# Patient Record
Sex: MALE | Race: WHITE | Employment: FULL TIME | RURAL
[De-identification: names, ages, dates, MRNs, and addresses within clinical notes are randomized per-mention and may not be internally consistent; named-entity substitution may affect disease eponyms.]

---

## 2017-01-13 ENCOUNTER — OFFICE VISIT (OUTPATIENT)
Dept: FAMILY MEDICINE CLINIC | Age: 27
End: 2017-01-13

## 2017-01-13 VITALS
RESPIRATION RATE: 17 BRPM | TEMPERATURE: 98.6 F | OXYGEN SATURATION: 98 % | DIASTOLIC BLOOD PRESSURE: 80 MMHG | SYSTOLIC BLOOD PRESSURE: 144 MMHG | HEART RATE: 76 BPM | WEIGHT: 222.2 LBS | BODY MASS INDEX: 35.86 KG/M2

## 2017-01-13 NOTE — PATIENT INSTRUCTIONS
Elevated Blood Pressure: Care Instructions  Your Care Instructions    Blood pressure is a measure of how hard the blood pushes against the walls of your arteries. It's normal for blood pressure to go up and down throughout the day. But if it stays up over time, you have high blood pressure. Two numbers tell you your blood pressure. The first number is the systolic pressure. It shows how hard the blood pushes when your heart is pumping. The second number is the diastolic pressure. It shows how hard the blood pushes between heartbeats, when your heart is relaxed and filling with blood. An ideal blood pressure in adults is less than 120/80 (say \"120 over 80\"). High blood pressure is 140/90 or higher. You have high blood pressure if your top number is 140 or higher or your bottom number is 90 or higher, or both. The main test for high blood pressure is simple, fast, and painless. To diagnose high blood pressure, your doctor will test your blood pressure at different times. You may have to check your blood pressure at home if there is reason to think that the results in the doctor's office aren't accurate. If you are diagnosed with high blood pressure, you can work with your doctor to make a long-term plan to manage it. Follow-up care is a key part of your treatment and safety. Be sure to make and go to all appointments, and call your doctor if you are having problems. It's also a good idea to know your test results and keep a list of the medicines you take. How can you care for yourself at home? · Do not smoke. Smoking increases your risk for heart attack and stroke. If you need help quitting, talk to your doctor about stop-smoking programs and medicines. These can increase your chances of quitting for good. · Stay at a healthy weight. · Try to limit how much sodium you eat to less than 2,300 milligrams (mg) a day. Your doctor may ask you to try to eat less than 1,500 mg a day. · Be physically active.  Get at least 30 minutes of exercise on most days of the week. Walking is a good choice. You also may want to do other activities, such as running, swimming, cycling, or playing tennis or team sports. · Avoid or limit alcohol. Talk to your doctor about whether you can drink any alcohol. · Eat plenty of fruits, vegetables, and low-fat dairy products. Eat less saturated and total fats. · Learn how to check your blood pressure at home. When should you call for help? Call your doctor now or seek immediate medical care if:  · Your blood pressure is much higher than normal (such as 180/110 or higher). · You think high blood pressure is causing symptoms such as:  ¨ Severe headache. ¨ Blurry vision. Watch closely for changes in your health, and be sure to contact your doctor if:  · You do not get better as expected. Where can you learn more? Go to http://thomas-akbar.info/. Enter T480 in the search box to learn more about \"Elevated Blood Pressure: Care Instructions. \"  Current as of: January 27, 2016  Content Version: 11.1  © 8924-5934 [employer]. Care instructions adapted under license by [employer] (which disclaims liability or warranty for this information). If you have questions about a medical condition or this instruction, always ask your healthcare professional. [address] any warranty or liability for your use of this information.

## 2017-01-13 NOTE — PROGRESS NOTES
Alisa Almaraz is a 32 y.o. male presenting for/with:    Physical      HPI:  Symptoms include occ sleep issues still. Using elevated pillow helps. Did get sleep study (home check) last winter, but never heard results. Saw Dr. Leticia Walsh. [place] Sleepiness Score: 2    Otherwise no complaints. Can run > half mile without stopping. Can lift 50# without dyspnea. PMH, SH, Medications/Allergies: reviewed, on chart. ROS:  Constitutional: No fever, chills or weight loss  Respiratory: No cough, SOB   CV: No chest pain or Palpitations    Visit Vitals    /80 (BP 1 Location: Right arm, BP Patient Position: Sitting)  Comment (BP 1 Location): machine    Pulse 76    Temp 98.6 °F (37 °C) (Oral)    Resp 17    Wt 222 lb 3.2 oz (100.8 kg)    SpO2 98%    BMI 35.86 kg/m2       Wt Readings from Last 3 Encounters:   01/13/17 222 lb 3.2 oz (100.8 kg)   02/24/16 209 lb (94.8 kg)   01/27/16 204 lb (92.5 kg)     Physical Examination: General appearance - alert, well appearing, and in no distress  Mental status - alert, oriented to person, place, and time  Eyes - pupils equal and reactive, extraocular eye movements intact  ENT - bilateral external ears and nose normal. Normal lips  Neck - supple, no significant adenopathy, no thyromegaly or mass  Lymphatics - no palpable lymphadenopathy, no hepatosplenomegaly  Chest - clear to auscultation, no wheezes, rales or rhonchi, symmetric air entry  Heart - normal rate, regular rhythm, normal S1, S2, no murmurs, rubs, clicks or gallops  Extremities - peripheral pulses normal, no pedal edema, no clubbing or cyanosis    A/P:  Well male   Fit for full duty. Elev BP  Mild. Work on [employer]. Recheck 1y or PRN.     F/U PRN/1y

## 2017-01-13 NOTE — MR AVS SNAPSHOT
Visit Information Date & Time Provider Department Dept. Phone Encounter #  
 1/13/2017  1:40 PM Anni Valentine MD [facility] [id number] Follow-up Instructions Return in about 1 year (around 1/13/2018). Upcoming Health Maintenance Date Due INFLUENZA AGE 9 TO ADULT 8/1/2016 DTaP/Tdap/Td series (3 - Td) 1/27/2026 Allergies as of 1/13/2017  Review Complete On: 1/13/2017 By: Anni Valentine MD  
 No Known Allergies Current Immunizations  Reviewed on 1/27/2016 Name Date Hep B Vaccine 4/19/2001, 12/14/2000, 10/12/2000 Influenza Vaccine 8/17/2016, 11/12/2015 MMR 5/3/1995, 6/15/1991 Meningococcal Vaccine 7/31/2008 Td 9/12/2005 Tdap 1/27/2016 Not reviewed this visit You Were Diagnosed With   
  
 Codes Comments Elevated blood pressure    -  Primary ICD-10-CM: I10 
ICD-9-CM: 401.9 Vitals BP Pulse Temp Resp Weight(growth percentile) SpO2  
 144/80 (BP 1 Location: Right arm, BP Patient Position: Sitting) 76 98.6 °F (37 °C) (Oral) 17 222 lb 3.2 oz (100.8 kg) 98% BMI Smoking Status 35.86 kg/m2 Never Smoker Vitals History BMI and BSA Data Body Mass Index Body Surface Area  
 35.86 kg/m 2 2.17 m 2 Preferred Pharmacy Pharmacy Name Phone [id number], [address] [place] [phone] Your Updated Medication List  
  
Notice  As of 1/13/2017  2:08 PM  
 You have not been prescribed any medications. Follow-up Instructions Return in about 1 year (around 1/13/2018). Patient Instructions Elevated Blood Pressure: Care Instructions Your Care Instructions Blood pressure is a measure of how hard the blood pushes against the walls of your arteries. It's normal for blood pressure to go up and down throughout the day. But if it stays up over time, you have high blood pressure. Two numbers tell you your blood pressure. The first number is the systolic pressure. It shows how hard the blood pushes when your heart is pumping. The second number is the diastolic pressure. It shows how hard the blood pushes between heartbeats, when your heart is relaxed and filling with blood. An ideal blood pressure in adults is less than 120/80 (say \"120 over 80\"). High blood pressure is 140/90 or higher. You have high blood pressure if your top number is 140 or higher or your bottom number is 90 or higher, or both. The main test for high blood pressure is simple, fast, and painless. To diagnose high blood pressure, your doctor will test your blood pressure at different times. You may have to check your blood pressure at home if there is reason to think that the results in the doctor's office aren't accurate. If you are diagnosed with high blood pressure, you can work with your doctor to make a long-term plan to manage it. Follow-up care is a key part of your treatment and safety. Be sure to make and go to all appointments, and call your doctor if you are having problems. It's also a good idea to know your test results and keep a list of the medicines you take. How can you care for yourself at home? · Do not smoke. Smoking increases your risk for heart attack and stroke. If you need help quitting, talk to your doctor about stop-smoking programs and medicines. These can increase your chances of quitting for good. · Stay at a healthy weight. · Try to limit how much sodium you eat to less than 2,300 milligrams (mg) a day. Your doctor may ask you to try to eat less than 1,500 mg a day. · Be physically active. Get at least 30 minutes of exercise on most days of the week. Walking is a good choice. You also may want to do other activities, such as running, swimming, cycling, or playing tennis or team sports. · Avoid or limit alcohol. Talk to your doctor about whether you can drink any alcohol. · Eat plenty of fruits, vegetables, and low-fat dairy products. Eat less saturated and total fats. · Learn how to check your blood pressure at home. When should you call for help? Call your doctor now or seek immediate medical care if: 
· Your blood pressure is much higher than normal (such as 180/110 or higher). · You think high blood pressure is causing symptoms such as: ¨ Severe headache. ¨ Blurry vision. Watch closely for changes in your health, and be sure to contact your doctor if: 
· You do not get better as expected. Where can you learn more? Go to http://thomas-akbar.info/. Enter M915 in the search box to learn more about \"Elevated Blood Pressure: Care Instructions. \" Current as of: January 27, 2016 Content Version: 11.1 © 4043-7720 [employer]. Care instructions adapted under license by [employer] (which disclaims liability or warranty for this information). If you have questions about a medical condition or this instruction, always ask your healthcare professional. [place] [address] any warranty or liability for your use of this information. Introducing [facility] & HEALTH SERVICES! Dear Oswaldo Schultz: 
Thank you for requesting a MyCrowd account. Our records indicate that you already have an active MyCrowd account. You can access your account anytime at [email or web address] Did you know that you can access your hospital and ER discharge instructions at any time in MyCrowd? You can also review all of your test results from your hospital stay or ER visit. Additional Information If you have questions, please visit the Frequently Asked Questions section of the MyCrowd website at [email or web address] Remember, MyCrowd is NOT to be used for urgent needs. For medical emergencies, dial 911. Now available from your iPhone and Android! Please provide this summary of care documentation to your next provider. Your primary care clinician is listed as Abraham Walton. If you have any questions after today's visit, please call [phone].

## 2017-05-26 ENCOUNTER — OFFICE VISIT (OUTPATIENT)
Dept: FAMILY MEDICINE CLINIC | Age: 27
End: 2017-05-26

## 2017-05-26 VITALS
BODY MASS INDEX: 32.72 KG/M2 | WEIGHT: 203.6 LBS | DIASTOLIC BLOOD PRESSURE: 82 MMHG | HEIGHT: 66 IN | TEMPERATURE: 97.8 F | HEART RATE: 62 BPM | OXYGEN SATURATION: 98 % | RESPIRATION RATE: 18 BRPM | SYSTOLIC BLOOD PRESSURE: 142 MMHG

## 2017-05-26 DIAGNOSIS — F43.0 STRESS REACTION: Primary | ICD-10-CM
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
## 2017-05-26 NOTE — MR AVS SNAPSHOT
Visit Information Date & Time Provider Department Dept. Phone Encounter #  
 5/26/2017  2:20 PM Jayden Jaimes MD [facility] [id number] Follow-up Instructions Return if symptoms worsen or fail to improve. Follow-up and Disposition History Upcoming Health Maintenance Date Due INFLUENZA AGE 9 TO ADULT 8/1/2017 DTaP/Tdap/Td series (3 - Td) 1/27/2026 Allergies as of 5/26/2017  Review Complete On: 5/26/2017 By: Jayden Jaimes MD  
 No Known Allergies Current Immunizations  Reviewed on 5/26/2017 Name Date Hep B Vaccine 4/19/2001, 12/14/2000, 10/12/2000 Influenza Vaccine 8/17/2016, 11/12/2015 MMR 5/3/1995, 6/15/1991 Meningococcal Vaccine 7/31/2008 Td 9/12/2005 Tdap 1/27/2016 Reviewed by Lori Flores on 5/26/2017 at  2:30 PM  
You Were Diagnosed With   
  
 Codes Comments Stress reaction    -  Primary ICD-10-CM: F43.0 ICD-9-CM: 308. 9 Vitals BP Pulse Temp Resp Height(growth percentile) Weight(growth percentile) 142/82 (BP 1 Location: Right arm, BP Patient Position: Sitting) 62 97.8 °F (36.6 °C) (Oral) 18 5' 6\" (1.676 m) 203 lb 9.6 oz (92.4 kg) SpO2 BMI Smoking Status 98% 32.86 kg/m2 Never Smoker BMI and BSA Data Body Mass Index Body Surface Area  
 32.86 kg/m 2 2.07 m 2 Preferred Pharmacy Pharmacy Name Phone [id number] [place], [address] [place] [phone] Your Updated Medication List  
  
Notice  As of 5/26/2017  3:41 PM  
 You have not been prescribed any medications. Follow-up Instructions Return if symptoms worsen or fail to improve. Introducing [facility] & HEALTH SERVICES! Dear Sang Eddy: 
Thank you for requesting a Meliuz account. Our records indicate that you already have an active Meliuz account. You can access your account anytime at [email or web address] Did you know that you can access your hospital and ER discharge instructions at any time in MysteryD? You can also review all of your test results from your hospital stay or ER visit. Additional Information If you have questions, please visit the Frequently Asked Questions section of the MysteryD website at [email or web address] Remember, MysteryD is NOT to be used for urgent needs. For medical emergencies, dial 911. Now available from your iPhone and Android! Please provide this summary of care documentation to your next provider. Your primary care clinician is listed as Mo Walton. If you have any questions after today's visit, please call [phone].

## 2017-05-26 NOTE — LETTER
5/26/2017 3:36 PM 
 
Re: 
Mr. [facility] [address] [facility] [address] [place] [address] To whom it may concern, 
 
I have performed a psychological and physical evaluation of above patient today and found him to be fit for full duty. He does not show any sign of physical or psychological impairment on exam. 
 
 
 
 
Sincerely, Jared Hdez MD

## 2018-04-04 ENCOUNTER — TELEPHONE (OUTPATIENT)
Dept: SLEEP MEDICINE | Age: 28
End: 2018-04-04

## 2018-04-04 DIAGNOSIS — G47.33 OSA (OBSTRUCTIVE SLEEP APNEA): Primary | ICD-10-CM

## 2018-04-04 NOTE — TELEPHONE ENCOUNTER
Patient was called and has agreed to a PSG a [facility] on April 22,2018. [facility] SLEEP LAB USERS POOL to schedule.

## 2018-04-04 NOTE — TELEPHONE ENCOUNTER
Patient failed HSAT at previous attempt, recommend attended PSG at [facility].     Orders Placed This Encounter    POLYSOMNOGRAPHY 1 NIGHT     Standing Status:   Future     Standing Expiration Date:   10/3/2018     Order Specific Question:   Reason for Exam     Answer:   RYAN

## 2018-04-20 ENCOUNTER — TELEPHONE (OUTPATIENT)
Dept: SLEEP MEDICINE | Age: 28
End: 2018-04-20

## 2018-05-11 ENCOUNTER — DOCUMENTATION ONLY (OUTPATIENT)
Dept: SLEEP MEDICINE | Age: 28
End: 2018-05-11

## 2018-08-21 ENCOUNTER — TELEPHONE (OUTPATIENT)
Dept: FAMILY MEDICINE CLINIC | Age: 28
End: 2018-08-21

## 2018-08-21 NOTE — TELEPHONE ENCOUNTER
----- Message from Althea Cortez sent at 8/20/2018  4:40 PM EDT -----  Regarding: Dr. Kiley Damon  Pt requested a call back in regards to scheduling an sooner appt than 8/24/18 due to back spasmus. Best contact [phone].

## 2023-03-21 ENCOUNTER — HOSPITAL ENCOUNTER (EMERGENCY)
Age: 33
Discharge: HOME | End: 2023-03-21
Attending: EMERGENCY MEDICINE
Payer: COMMERCIAL

## 2023-03-21 VITALS
DIASTOLIC BLOOD PRESSURE: 90 MMHG | TEMPERATURE: 99.1 F | OXYGEN SATURATION: 97 % | RESPIRATION RATE: 17 BRPM | SYSTOLIC BLOOD PRESSURE: 177 MMHG | WEIGHT: 215 LBS | HEART RATE: 100 BPM | BODY MASS INDEX: 34.7 KG/M2

## 2023-03-21 DIAGNOSIS — M62.838 MUSCLE SPASMS OF NECK: Primary | ICD-10-CM

## 2023-03-21 PROCEDURE — 99283 EMERGENCY DEPT VISIT LOW MDM: CPT

## 2023-03-21 PROCEDURE — 74011250637 HC RX REV CODE- 250/637: Performed by: EMERGENCY MEDICINE

## 2023-03-21 RX ORDER — OXYCODONE HYDROCHLORIDE 5 MG/1
5 TABLET ORAL
Status: COMPLETED | OUTPATIENT
Start: 2023-03-21 | End: 2023-03-21

## 2023-03-21 RX ORDER — NAPROXEN 250 MG/1
500 TABLET ORAL
Status: COMPLETED | OUTPATIENT
Start: 2023-03-21 | End: 2023-03-21

## 2023-03-21 RX ORDER — OXYCODONE HYDROCHLORIDE 5 MG/1
5 TABLET ORAL
Status: DISCONTINUED | OUTPATIENT
Start: 2023-03-21 | End: 2023-03-21

## 2023-03-21 RX ORDER — OXYCODONE HYDROCHLORIDE 5 MG/1
5 TABLET ORAL
Qty: 6 TABLET | Refills: 0 | Status: SHIPPED | OUTPATIENT
Start: 2023-03-21 | End: 2023-03-24

## 2023-03-21 RX ORDER — NAPROXEN 500 MG/1
500 TABLET ORAL
Qty: 20 TABLET | Refills: 0 | Status: SHIPPED | OUTPATIENT
Start: 2023-03-21

## 2023-03-21 RX ADMIN — NAPROXEN 500 MG: 250 TABLET ORAL at 18:18

## 2023-03-21 RX ADMIN — OXYCODONE 5 MG: 5 TABLET ORAL at 18:18

## 2023-03-21 NOTE — ED PROVIDER NOTES
[address] ENCOUNTER       Pt Name: Boubacar Cooper  MRN: [id number]  [place] 1990  Date of evaluation: 3/21/2023  Provider: Ute Juarez MD   PCP: Uziel Winkler MD  Note Started: 5:38 PM 3/21/23     CHIEF COMPLAINT       Chief Complaint   Patient presents with    Neck Pain        HISTORY OF PRESENT ILLNESS: 1 or more elements      History From: patient, History limited by: none     Boubacar Cooper is a 35 y.o. male who presents with a chief complaint of acute, severe, left shoulder and neck pain ongoing for last 3 weeks. Has already been evaluated by orthopedics. Please See MDM for Additional Details of the HPI/PMH  Nursing Notes were all reviewed and agreed with or any disagreements were addressed in the HPI. REVIEW OF SYSTEMS        Positives and Pertinent negatives as per HPI. PAST HISTORY     Past Medical History:  Past Medical History:   Diagnosis Date    Sleep apnea with use of continuous positive airway pressure (CPAP)     Snoring        Past Surgical History:  No past surgical history on file. Family History:  Family History   Problem Relation Age of Onset    Hearing Impairment Father     Hypertension Maternal Grandfather     Heart Disease Maternal Grandfather     Heart Disease Paternal Grandmother     Cancer Paternal Grandfather        Social History:  Social History     Tobacco Use    Smoking status: Never    Smokeless tobacco: Former     Types: Chew    Tobacco comments:     chewed 2 year   Substance Use Topics    Alcohol use:  Yes     Alcohol/week: 0.0 standard drinks     Comment: social    Drug use: No       Allergies:  No Known Allergies    CURRENT MEDICATIONS      Discharge Medication List as of 3/21/2023  7:04 PM          SCREENINGS               No data recorded         PHYSICAL EXAM      ED Triage Vitals [03/21/23 1717]   ED Encounter Vitals Group      BP (!) 177/90      Pulse (Heart Rate) 100      Resp Rate 17      Temp 99.1 °F (37.3 °C)      Temp src       O2 Sat (%) 97 %      Weight 215 lb      Height         Physical Exam  Vitals and nursing note reviewed. Constitutional:       General: He is not in acute distress. Appearance: He is well-developed. HENT:      Head: Normocephalic and atraumatic. Eyes:      Conjunctiva/sclera: Conjunctivae normal.      Pupils: Pupils are equal, round, and reactive to light. Cardiovascular:      Rate and Rhythm: Normal rate and regular rhythm. Pulmonary:      Effort: Pulmonary effort is normal. No respiratory distress. Musculoskeletal:         General: Normal range of motion. Cervical back: Normal range of motion. Tenderness present. Muscular tenderness (palpable spasm over the L cervical paraspinal muscles extended over the trapezius) present. Comments: Left upper extremity is distally neurovascular intact. Skin:     General: Skin is warm and dry. Neurological:      Mental Status: He is alert and oriented to person, place, and time. Psychiatric:         Mood and Affect: Mood normal.        DIAGNOSTIC RESULTS   LABS:     No results found for this or any previous visit (from the past 12 hour(s)). EKG: If performed, independent interpretation documented below in the MDM section     RADIOLOGY:  Non-plain film images such as CT, Ultrasound and MRI are read by the radiologist. Plain radiographic images are visualized and preliminarily interpreted by the ED Provider with the findings documented in the MDM section. Interpretation per the Radiologist below, if available at the time of this note:     No results found.       PROCEDURES   Unless otherwise noted below, none  Procedures     CRITICAL CARE TIME   0    EMERGENCY DEPARTMENT COURSE and DIFFERENTIAL DIAGNOSIS/MDM   Vitals:    Vitals:    03/21/23 1717   BP: (!) 177/90   Pulse: 100   Resp: 17   Temp: 99.1 °F (37.3 °C)   SpO2: 97%   Weight: 97.5 kg (215 lb)        Patient was given the following medications:  Medications naproxen (NAPROSYN) tablet 500 mg (500 mg Oral Given 3/21/23 1818)   oxyCODONE IR (ROXICODONE) tablet 5 mg (5 mg Oral Given 3/21/23 1818)       Medical Decision Making  59-year-old male who presents with a chief complaint of acute, severe, left shoulder and neck pain for 3 weeks. 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 \"  He was seen a week ago by orthopedics and prescribed steroids and muscle relaxers but he tells me they are not working. He tells me they stated that he had a C5/C6 radiculopathy. He has another follow-up appointment scheduled with them but tells me the pain is unmanageable so come to the ED for evaluation. He has no numbness or weakness in the left upper extremity but states the pain is worse with any movement of his left arm. He denies any new falls or recent trauma. Denies any history of back surgery or drug use. On exam he appears uncomfortable, hypertensive with otherwise stable vital signs. He has palpable muscle spasm in the trapezius and paraspinal muscles of the left cervical neck. I suspect his pain is secondary to muscle spasm. He has no neurologic symptoms in the upper extremity. I do not feel any imaging is needed at this time. We will treat with a short course of narcotic pain medication and NSAIDs at home. He was advised to call the orthopedist with whom he is already established care for follow-up. He does have an appointment on 4/6 and is scheduled to start PT on 3/31. I advised that he call to see if he can get a closer follow-up due to his symptoms. Problems Addressed:  Muscle spasms of neck: acute illness or injury    Risk  Prescription drug management. FINAL IMPRESSION     1. Muscle spasms of neck          DISPOSITION/PLAN   Dilan SOLORZANO  results have been reviewed with him. He has been counseled regarding his diagnosis, treatment, and plan.   He verbally conveys understanding and agreement of the signs, symptoms, diagnosis, treatment and prognosis and additionally agrees to follow up as discussed. He also agrees with the care-plan and conveys that all of his questions have been answered. I have also provided discharge instructions for him that include: educational information regarding their diagnosis and treatment, and list of reasons why they would want to return to the ED prior to their follow-up appointment, should his condition change. CLINICAL IMPRESSION    Discharge Note: The patient is stable for discharge home. The signs, symptoms, diagnosis, and discharge instructions have been discussed, understanding conveyed, and agreed upon. The patient is to follow up as recommended or return to ER should their symptoms worsen. PATIENT REFERRED TO:  Follow-up Information       Follow up With Specialties Details Why Contact Info    Nidhi Fragoso MD Family Medicine Schedule an appointment as soon as possible for a visit   [facility] [address] [place] [address]  [phone]              DISCHARGE MEDICATIONS:  Discharge Medication List as of 3/21/2023  7:04 PM        START taking these medications    Details   naproxen (NAPROSYN) 500 mg tablet Take 1 Tablet by mouth every twelve (12) hours as needed for Pain., Normal, Disp-20 Tablet, R-0      oxyCODONE IR (ROXICODONE) 5 mg immediate release tablet Take 1 Tablet by mouth every six (6) hours as needed for Pain for up to 3 days. Max Daily Amount: 20 mg., Normal, Disp-6 Tablet, R-0               DISCONTINUED MEDICATIONS:  Discharge Medication List as of 3/21/2023  7:04 PM          I am the Primary Clinician of Record. Stephania Espinoza MD (electronically signed)    (Please note that parts of this dictation were completed with voice recognition software.  Quite often unanticipated grammatical, syntax, homophones, and other interpretive errors are inadvertently transcribed by the computer software. Please disregards these errors.  Please excuse any errors that have escaped final proofreading.)

## 2023-03-21 NOTE — ED TRIAGE NOTES
Pt has c/o neck/shoulder pain that is ongoing.  States he has a \"pinched nerve\" and the pain is unbearable

## 2023-04-27 RX ORDER — NAPROXEN 500 MG/1
500 TABLET ORAL
COMMUNITY
Start: 2023-03-21 | End: 2023-06-14